# Patient Record
Sex: MALE | Race: WHITE | NOT HISPANIC OR LATINO | Employment: FULL TIME | RURAL
[De-identification: names, ages, dates, MRNs, and addresses within clinical notes are randomized per-mention and may not be internally consistent; named-entity substitution may affect disease eponyms.]

---

## 2022-02-01 ENCOUNTER — HOSPITAL ENCOUNTER (EMERGENCY)
Facility: HOSPITAL | Age: 47
Discharge: HOME | End: 2022-02-01
Attending: INTERNAL MEDICINE
Payer: COMMERCIAL

## 2022-02-01 VITALS
HEIGHT: 72 IN | TEMPERATURE: 98 F | OXYGEN SATURATION: 95 % | WEIGHT: 276.88 LBS | RESPIRATION RATE: 18 BRPM | DIASTOLIC BLOOD PRESSURE: 60 MMHG | HEART RATE: 94 BPM | BODY MASS INDEX: 37.5 KG/M2 | SYSTOLIC BLOOD PRESSURE: 126 MMHG

## 2022-02-01 DIAGNOSIS — K42.9 UMBILICAL HERNIA WITHOUT OBSTRUCTION AND WITHOUT GANGRENE: Primary | ICD-10-CM

## 2022-02-01 LAB
ALBUMIN SERPL BCP-MCNC: 3.5 G/DL (ref 3.5–5)
ALBUMIN/GLOB SERPL: 0.8 {RATIO}
ALP SERPL-CCNC: 74 U/L (ref 45–115)
ALT SERPL W P-5'-P-CCNC: 300 U/L (ref 16–61)
ANION GAP SERPL CALCULATED.3IONS-SCNC: 11 MMOL/L (ref 7–16)
AST SERPL W P-5'-P-CCNC: 146 U/L (ref 15–37)
BASOPHILS # BLD AUTO: 0.04 K/UL (ref 0–0.2)
BASOPHILS NFR BLD AUTO: 0.6 % (ref 0–1)
BILIRUB SERPL-MCNC: 1.1 MG/DL (ref 0–1.2)
BUN SERPL-MCNC: 22 MG/DL (ref 7–18)
BUN/CREAT SERPL: 17 (ref 6–20)
CALCIUM SERPL-MCNC: 8.6 MG/DL (ref 8.5–10.1)
CHLORIDE SERPL-SCNC: 102 MMOL/L (ref 98–107)
CO2 SERPL-SCNC: 30 MMOL/L (ref 21–32)
CREAT SERPL-MCNC: 1.31 MG/DL (ref 0.7–1.3)
DIFFERENTIAL METHOD BLD: ABNORMAL
EOSINOPHIL # BLD AUTO: 0.22 K/UL (ref 0–0.5)
EOSINOPHIL NFR BLD AUTO: 3.3 % (ref 1–4)
ERYTHROCYTE [DISTWIDTH] IN BLOOD BY AUTOMATED COUNT: 14.1 % (ref 11.5–14.5)
GLOBULIN SER-MCNC: 4.3 G/DL (ref 2–4)
GLUCOSE SERPL-MCNC: 118 MG/DL (ref 74–106)
HCT VFR BLD AUTO: 41.3 % (ref 40–54)
HGB BLD-MCNC: 13.8 G/DL (ref 13.5–18)
IMM GRANULOCYTES # BLD AUTO: 0.01 K/UL (ref 0–0.04)
IMM GRANULOCYTES NFR BLD: 0.2 % (ref 0–0.4)
LIPASE SERPL-CCNC: 61 U/L (ref 73–393)
LYMPHOCYTES # BLD AUTO: 2.7 K/UL (ref 1–4.8)
LYMPHOCYTES NFR BLD AUTO: 40.8 % (ref 27–41)
MCH RBC QN AUTO: 32.2 PG (ref 27–31)
MCHC RBC AUTO-ENTMCNC: 33.4 G/DL (ref 32–36)
MCV RBC AUTO: 96.5 FL (ref 80–96)
MONOCYTES # BLD AUTO: 0.53 K/UL (ref 0–0.8)
MONOCYTES NFR BLD AUTO: 8 % (ref 2–6)
MPC BLD CALC-MCNC: 9.9 FL (ref 9.4–12.4)
NEUTROPHILS # BLD AUTO: 3.11 K/UL (ref 1.8–7.7)
NEUTROPHILS NFR BLD AUTO: 47.1 % (ref 53–65)
PLATELET # BLD AUTO: 208 K/UL (ref 150–400)
POTASSIUM SERPL-SCNC: 3.6 MMOL/L (ref 3.5–5.1)
PROT SERPL-MCNC: 7.8 G/DL (ref 6.4–8.2)
RBC # BLD AUTO: 4.28 M/UL (ref 4.6–6.2)
SODIUM SERPL-SCNC: 139 MMOL/L (ref 136–145)
WBC # BLD AUTO: 6.61 K/UL (ref 4.5–11)

## 2022-02-01 PROCEDURE — 99284 EMERGENCY DEPT VISIT MOD MDM: CPT | Mod: 25

## 2022-02-01 PROCEDURE — 99282 EMERGENCY DEPT VISIT SF MDM: CPT | Mod: ,,, | Performed by: INTERNAL MEDICINE

## 2022-02-01 PROCEDURE — 80053 COMPREHEN METABOLIC PANEL: CPT | Performed by: INTERNAL MEDICINE

## 2022-02-01 PROCEDURE — 85025 COMPLETE CBC W/AUTO DIFF WBC: CPT | Performed by: INTERNAL MEDICINE

## 2022-02-01 PROCEDURE — 36415 COLL VENOUS BLD VENIPUNCTURE: CPT | Performed by: INTERNAL MEDICINE

## 2022-02-01 PROCEDURE — 83690 ASSAY OF LIPASE: CPT | Performed by: INTERNAL MEDICINE

## 2022-02-01 PROCEDURE — 99282 PR EMERGENCY DEPT VISIT,LEVEL II: ICD-10-PCS | Mod: ,,, | Performed by: INTERNAL MEDICINE

## 2022-02-02 ENCOUNTER — TELEPHONE (OUTPATIENT)
Dept: EMERGENCY MEDICINE | Facility: HOSPITAL | Age: 47
End: 2022-02-02
Payer: COMMERCIAL

## 2022-02-02 NOTE — ED PROVIDER NOTES
Encounter Date: 2/1/2022       History     Chief Complaint   Patient presents with    Abdominal Pain     RT OF UMBILICUS     Patient complains of abdominal distention and pain for least 2 weeks.  He thinks he has a hernia but has not seen a primary care or family doctor surgeon . he denies any nausea vomiting diarrhea or GI bleed.  He states his stomach is bulging out.        Review of patient's allergies indicates:  No Known AllergiesHistory reviewed. No pertinent past medical history.  History reviewed. No pertinent surgical history.  History reviewed. No pertinent family history.  Social History     Tobacco Use    Smoking status: Current Every Day Smoker    Smokeless tobacco: Current User   Substance Use Topics    Alcohol use: Not Currently     Comment: SOCIAL DRINKER    Drug use: Never     Review of Systems   Constitutional: Negative for fever.   HENT: Negative for sore throat.    Respiratory: Negative for shortness of breath.    Cardiovascular: Negative for chest pain.   Gastrointestinal: Negative for nausea.   Genitourinary: Negative for dysuria.   Musculoskeletal: Negative for back pain.   Skin: Negative for rash.   Neurological: Negative for weakness.   Hematological: Does not bruise/bleed easily.       Physical Exam     Initial Vitals [02/01/22 2152]   BP Pulse Resp Temp SpO2   (!) 144/80 100 19 98.1 °F (36.7 °C) 95 %      MAP       --         Physical Exam    Nursing note and vitals reviewed.  Constitutional: He appears well-developed.   HENT:   Head: Normocephalic.   Eyes: Pupils are equal, round, and reactive to light.   Neck:   Normal range of motion.  Cardiovascular: Normal rate.   Pulmonary/Chest: Breath sounds normal.   Abdominal: Abdomen is soft and protuberant. Bowel sounds are decreased. There is generalized abdominal tenderness. A hernia is present. Hernia confirmed positive in the umbilical area.   Musculoskeletal:         General: Normal range of motion.      Cervical back: Normal range  of motion.     Neurological: He is alert. He has normal reflexes.   Skin: Skin is warm.         Medical Screening Exam   See Full Note    ED Course   Procedures  Labs Reviewed   COMPREHENSIVE METABOLIC PANEL - Abnormal; Notable for the following components:       Result Value    Glucose 118 (*)     BUN 22 (*)     Creatinine 1.31 (*)     Globulin 4.3 (*)      (*)      (*)     All other components within normal limits   LIPASE - Abnormal; Notable for the following components:    Lipase 61 (*)     All other components within normal limits   CBC WITH DIFFERENTIAL - Abnormal; Notable for the following components:    RBC 4.28 (*)     MCV 96.5 (*)     MCH 32.2 (*)     Neutrophils % 47.1 (*)     Monocytes % 8.0 (*)     All other components within normal limits   CBC W/ AUTO DIFFERENTIAL    Narrative:     The following orders were created for panel order CBC W/ AUTO DIFFERENTIAL.  Procedure                               Abnormality         Status                     ---------                               -----------         ------                     CBC with Differential[[id number]]        Abnormal            Final result                 Please view results for these tests on the individual orders.   URINALYSIS, REFLEX TO URINE CULTURE          Imaging Results          CT Abdomen Pelvis  Without Contrast (Preliminary result)  Result time 02/01/22 23:16:58    ED Interpretation by Porfirio Boyce MD (02/01/22 23:16:58, [facility], Emergency Medicine)    UMBILICAL HERNIA                               Medications - No data to display              ED Course as of 02/01/22 2318 Tue Feb 01, 2022   2221 CBC W/ AUTO DIFFERENTIAL(!) [PW]   2245 Lipase(!): 61 [PW]   2245 Comp. Metabolic Panel(!) [PW]   2313 Comp. Metabolic Panel(!) [PW]   2316 TO SEE SURGERY ON FRIDAY [PW]      ED Course User Index  [PW] Porfirio Boyce MD          Clinical Impression:   Final diagnoses:  [K42.9] Umbilical  hernia without obstruction and without gangrene (Primary)          ED Disposition Condition    Discharge Stable        ED Prescriptions     None        Follow-up Information     Follow up With Specialties Details Why Contact Info    BRIAN AS PLANNED  In 2 days             Porfirio Boyce MD  02/01/22 0256

## 2022-02-02 NOTE — ED TRIAGE NOTES
"PRESENTS WITH C/O "KNOT" TO RT OF UMBILICUS. ONSET APPROX 1 MONTH AGO. WORSE LAST 2 WEEKS. AREA TENDER TO PALPATION.  "